# Patient Record
Sex: FEMALE | Race: WHITE
[De-identification: names, ages, dates, MRNs, and addresses within clinical notes are randomized per-mention and may not be internally consistent; named-entity substitution may affect disease eponyms.]

---

## 2019-03-18 ENCOUNTER — HOSPITAL ENCOUNTER (INPATIENT)
Dept: HOSPITAL 54 - ER | Age: 34
LOS: 1 days | Discharge: HOME | DRG: 47 | End: 2019-03-19
Attending: INTERNAL MEDICINE | Admitting: INTERNAL MEDICINE
Payer: MEDICAID

## 2019-03-18 VITALS — DIASTOLIC BLOOD PRESSURE: 68 MMHG | SYSTOLIC BLOOD PRESSURE: 132 MMHG

## 2019-03-18 VITALS — SYSTOLIC BLOOD PRESSURE: 93 MMHG | DIASTOLIC BLOOD PRESSURE: 69 MMHG

## 2019-03-18 VITALS — DIASTOLIC BLOOD PRESSURE: 70 MMHG | SYSTOLIC BLOOD PRESSURE: 92 MMHG

## 2019-03-18 VITALS — SYSTOLIC BLOOD PRESSURE: 97 MMHG | DIASTOLIC BLOOD PRESSURE: 65 MMHG

## 2019-03-18 VITALS — BODY MASS INDEX: 27 KG/M2 | HEIGHT: 67 IN | WEIGHT: 172 LBS

## 2019-03-18 VITALS — SYSTOLIC BLOOD PRESSURE: 88 MMHG | DIASTOLIC BLOOD PRESSURE: 46 MMHG

## 2019-03-18 VITALS — DIASTOLIC BLOOD PRESSURE: 61 MMHG | SYSTOLIC BLOOD PRESSURE: 93 MMHG

## 2019-03-18 VITALS — DIASTOLIC BLOOD PRESSURE: 52 MMHG | SYSTOLIC BLOOD PRESSURE: 100 MMHG

## 2019-03-18 VITALS — SYSTOLIC BLOOD PRESSURE: 92 MMHG | DIASTOLIC BLOOD PRESSURE: 58 MMHG

## 2019-03-18 VITALS — DIASTOLIC BLOOD PRESSURE: 71 MMHG | SYSTOLIC BLOOD PRESSURE: 106 MMHG

## 2019-03-18 VITALS — DIASTOLIC BLOOD PRESSURE: 71 MMHG | SYSTOLIC BLOOD PRESSURE: 103 MMHG

## 2019-03-18 VITALS — SYSTOLIC BLOOD PRESSURE: 93 MMHG | DIASTOLIC BLOOD PRESSURE: 45 MMHG

## 2019-03-18 DIAGNOSIS — R29.810: ICD-10-CM

## 2019-03-18 DIAGNOSIS — G45.9: Primary | ICD-10-CM

## 2019-03-18 DIAGNOSIS — G43.909: ICD-10-CM

## 2019-03-18 LAB
ALBUMIN SERPL BCP-MCNC: 4.1 G/DL (ref 3.4–5)
ALP SERPL-CCNC: 82 U/L (ref 46–116)
ALT SERPL W P-5'-P-CCNC: 22 U/L (ref 12–78)
AST SERPL W P-5'-P-CCNC: 12 U/L (ref 15–37)
BASOPHILS # BLD AUTO: 0.1 /CMM (ref 0–0.2)
BASOPHILS NFR BLD AUTO: 0.8 % (ref 0–2)
BILIRUB DIRECT SERPL-MCNC: 0.1 MG/DL (ref 0–0.2)
BILIRUB SERPL-MCNC: 0.2 MG/DL (ref 0.2–1)
BUN SERPL-MCNC: 17 MG/DL (ref 7–18)
CALCIUM SERPL-MCNC: 9.1 MG/DL (ref 8.5–10.1)
CHLORIDE SERPL-SCNC: 106 MMOL/L (ref 98–107)
CHOLEST SERPL-MCNC: 166 MG/DL (ref ?–200)
CO2 SERPL-SCNC: 28 MMOL/L (ref 21–32)
CREAT SERPL-MCNC: 0.7 MG/DL (ref 0.6–1.3)
EOSINOPHIL NFR BLD AUTO: 1.6 % (ref 0–6)
GLUCOSE SERPL-MCNC: 90 MG/DL (ref 74–106)
HCT VFR BLD AUTO: 41 % (ref 33–45)
HDLC SERPL-MCNC: 64 MG/DL (ref 40–60)
HGB BLD-MCNC: 13.6 G/DL (ref 11.5–14.8)
LDLC SERPL DIRECT ASSAY-MCNC: 92 MG/DL (ref 0–99)
LYMPHOCYTES NFR BLD AUTO: 2.8 /CMM (ref 0.8–4.8)
LYMPHOCYTES NFR BLD AUTO: 43.8 % (ref 20–44)
MCHC RBC AUTO-ENTMCNC: 33 G/DL (ref 31–36)
MCV RBC AUTO: 94 FL (ref 82–100)
MONOCYTES NFR BLD AUTO: 0.4 /CMM (ref 0.1–1.3)
MONOCYTES NFR BLD AUTO: 5.9 % (ref 2–12)
NEUTROPHILS # BLD AUTO: 3 /CMM (ref 1.8–8.9)
NEUTROPHILS NFR BLD AUTO: 47.9 % (ref 43–81)
PLATELET # BLD AUTO: 215 /CMM (ref 150–450)
POTASSIUM SERPL-SCNC: 3.7 MMOL/L (ref 3.5–5.1)
PROT SERPL-MCNC: 8 G/DL (ref 6.4–8.2)
RBC # BLD AUTO: 4.37 MIL/UL (ref 4–5.2)
SODIUM SERPL-SCNC: 144 MMOL/L (ref 136–145)
TRIGL SERPL-MCNC: 90 MG/DL (ref 30–150)
WBC NRBC COR # BLD AUTO: 6.4 K/UL (ref 4.3–11)

## 2019-03-18 PROCEDURE — G0378 HOSPITAL OBSERVATION PER HR: HCPCS

## 2019-03-18 PROCEDURE — A9579 GAD-BASE MR CONTRAST NOS,1ML: HCPCS

## 2019-03-18 NOTE — NUR
INITIAL ICU RN NOTE



RCVD PT AWAKE AND ALERT, SR/SB ON MONITOR, ON RA TOLERATING WELL, CONTINENT OF BOWEL AND 
URINE, SKIN INTACT. NEURO ASSESSMENT DONE PERIPHERAL VISION OVER LEFT SIDE PT SEES DOUBLE, 
RIGHT EYE SENSITIVE TO BRIGHT LIGHT, PUPILS EQUAL AND RESPONSIVE, DECREASED SENSATION OVER 
RIGHT ANTERIOR FOOT, RIGHT UPPER AND LOWER EXTREMITY REMAINS NUMB AND FEELS HEAVY PER PT'S 
REPORT, FULL ROM WITH ALL EXTREMITIES, DRIFT NOTED WITH RUE AND RLE DROPS TO BED BEFORE FIVE 
SECONDS. FINDINGS REPORTED TO DR. PINEDA WHO ACKNOWLEDGED. NO NEW ORDERS RCVD. WILL CONTINUE 
TO MONITOR.

## 2019-03-18 NOTE — NUR
C/O R SIDE OF THE FACE NUMBNESS AND DIZZY SINCE YESTERDAY 5PM.  ALSO 
RIGHT-SIDED WEAKNESS.  HAS HEADACHE 8/10.  SPEECH IS SLOW AND WEAK, BUT NO 
EVIDENCE OF SLURRING PRESENT.  PT DENIES DIFFICULTY SWALLOWING.  SHE IS AOX4, 
AMB, VSS, RR EVEN AND UNLABORED.  DENIES SOB, N/V, SKIN INTACT.  SEEN BY DR CAUSEY.

## 2019-03-18 NOTE — NUR
ICU RN NOTE



PT REMAINS STABLE, TELE STATUS PER DR. PINEDA, ON RA TOLERATING WELL, SR ON MONITOR, APPEARS 
MORE STEADY WHEN AMBULATING, TOLERATED PO INTAKE WELL. PT STATES TO FEEL MUCH BETTER NOW. 
PT'S CARE ENDORSED TO NIGHT SHIFT RN FOR CONTINUITY OF CARE, BE DIN LOW AND LOCKED POSITION. 
CALL LIGHT WITHIN REACH.

## 2019-03-19 VITALS — DIASTOLIC BLOOD PRESSURE: 50 MMHG | SYSTOLIC BLOOD PRESSURE: 91 MMHG

## 2019-03-19 VITALS — DIASTOLIC BLOOD PRESSURE: 55 MMHG | SYSTOLIC BLOOD PRESSURE: 86 MMHG

## 2019-03-19 VITALS — DIASTOLIC BLOOD PRESSURE: 55 MMHG | SYSTOLIC BLOOD PRESSURE: 90 MMHG

## 2019-03-19 VITALS — DIASTOLIC BLOOD PRESSURE: 72 MMHG | SYSTOLIC BLOOD PRESSURE: 112 MMHG

## 2019-03-19 VITALS — DIASTOLIC BLOOD PRESSURE: 55 MMHG | SYSTOLIC BLOOD PRESSURE: 97 MMHG

## 2019-03-19 VITALS — DIASTOLIC BLOOD PRESSURE: 53 MMHG | SYSTOLIC BLOOD PRESSURE: 83 MMHG

## 2019-03-19 VITALS — DIASTOLIC BLOOD PRESSURE: 65 MMHG | SYSTOLIC BLOOD PRESSURE: 91 MMHG

## 2019-03-19 VITALS — DIASTOLIC BLOOD PRESSURE: 56 MMHG | SYSTOLIC BLOOD PRESSURE: 88 MMHG

## 2019-03-19 LAB
CHOLEST SERPL-MCNC: 157 MG/DL (ref ?–200)
HDLC SERPL-MCNC: 55 MG/DL (ref 40–60)
LDLC SERPL DIRECT ASSAY-MCNC: 90 MG/DL (ref 0–99)
TRIGL SERPL-MCNC: 96 MG/DL (ref 30–150)
TSH SERPL DL<=0.005 MIU/L-ACNC: 1.21 UIU/ML (ref 0.36–3.74)

## 2019-03-19 NOTE — NUR
TELE RN NOTE:



PATIENT RESTING IN BED, NO ACUTE DISTRESS NOTED. BREATHING EVEN AND UNLABORED, NO SOB NOTED. 
IV TO RAC IN PLACE. BED LOCKED AND IN LOWEST POSITION, CALL LIGHT IN REACH. WILL ENDORSE TO 
DAY NURSE TO CONTINUE WITH PLAN OF CARE.

## 2019-03-19 NOTE — NUR
TELE RN NOTES



Received patient in bed, alert, oriented x 4. Breathing even and unlabored. not in any 
distress. no complaints of pain or discomfort as of this time. Patient stable as endorsed by 
the morning shift RN. Will continue to monitor

## 2019-03-19 NOTE — NUR
MS/RN   Headache



Complaining of headache, no orders for any pain medication. Dr Decker made aware and order 
given for tylenol only until seen by neurologist.

## 2019-03-19 NOTE — NUR
MS/RN   Rounds



Awaiting call back from Dr Decker, patient requesting to see neurologist and be discharged.

## 2019-03-19 NOTE — NUR
MS/RN   End note



Patient remains in stable condition, all needs attended, will endorse to night shift.

## 2019-03-19 NOTE — NUR
TELE RN NOTE:



RECEIVED PATIENT FROM ICU, NO ACUTE DISTRESS NOTED. BREATHING EVEN AND UNLABORED, NO SOB 
NOTED. IV TO RAC IN PLACE. BED LOCKED AND IN LOWEST POSITION, CALL LIGHT IN REACH. WILL 
CONTINUE TO MONITOR.

## 2019-03-19 NOTE — NUR
RN NOTES



Patient wheeled down to the parking lot by CNA in stable condition. All belongings brought 
down with patient

## 2019-03-19 NOTE — NUR
MS/RN   S/B Dr Ponce



Seen by Dr Ponce - waiting for consultation notes, ibuprofen 600mg ordered for headache.

## 2019-12-29 ENCOUNTER — HOSPITAL ENCOUNTER (EMERGENCY)
Dept: HOSPITAL 54 - ER | Age: 34
Discharge: HOME | End: 2019-12-29
Payer: MEDICAID

## 2019-12-29 VITALS — BODY MASS INDEX: 25.71 KG/M2 | WEIGHT: 160 LBS | HEIGHT: 66 IN

## 2019-12-29 VITALS — SYSTOLIC BLOOD PRESSURE: 107 MMHG | DIASTOLIC BLOOD PRESSURE: 67 MMHG

## 2019-12-29 DIAGNOSIS — L91.8: Primary | ICD-10-CM

## 2019-12-29 DIAGNOSIS — Z79.82: ICD-10-CM

## 2020-10-18 NOTE — NUR
RN NOTES



Patient for discharge. Discharge instructions given. Disc for all radiology imaging given. 
IV line and ID band taken out Home

## 2021-02-21 ENCOUNTER — HOSPITAL ENCOUNTER (EMERGENCY)
Dept: HOSPITAL 54 - ER | Age: 36
Discharge: HOME | End: 2021-02-21
Payer: MEDICAID

## 2021-02-21 VITALS — SYSTOLIC BLOOD PRESSURE: 114 MMHG | DIASTOLIC BLOOD PRESSURE: 73 MMHG

## 2021-02-21 VITALS — WEIGHT: 170 LBS | HEIGHT: 66 IN | BODY MASS INDEX: 27.32 KG/M2

## 2021-02-21 DIAGNOSIS — R11.0: ICD-10-CM

## 2021-02-21 DIAGNOSIS — G43.909: Primary | ICD-10-CM

## 2021-02-21 DIAGNOSIS — Z79.82: ICD-10-CM

## 2021-02-21 PROCEDURE — 96361 HYDRATE IV INFUSION ADD-ON: CPT

## 2021-02-21 PROCEDURE — 96375 TX/PRO/DX INJ NEW DRUG ADDON: CPT

## 2021-02-21 PROCEDURE — 99284 EMERGENCY DEPT VISIT MOD MDM: CPT

## 2021-02-21 PROCEDURE — 96374 THER/PROPH/DIAG INJ IV PUSH: CPT

## 2021-02-21 NOTE — NUR
MIGRAINE HEADACHE. PATIENT A/OX4, BREATHING EVEN AND UNLABORED, EYES ARE 
SENSITIVE TO LIGHT PER PATIENT. NEEDS ATTENDED.

## 2021-02-21 NOTE — NUR
Patient alert and oriented x4.Patient discharged to home in stable condition. 
Written and verbal after care instructions given. Patient verbalizes 
understanding of instruction. IV line removed, no bleeding noted but the site 
ocvered with folded 2x2. The patient left the hopsital in stable condition and 
accompanied by family member.

## 2021-07-07 ENCOUNTER — HOSPITAL ENCOUNTER (EMERGENCY)
Dept: HOSPITAL 54 - ER | Age: 36
Discharge: HOME | End: 2021-07-07
Payer: MEDICAID

## 2021-07-07 VITALS — BODY MASS INDEX: 25.88 KG/M2 | WEIGHT: 161 LBS | HEIGHT: 66 IN

## 2021-07-07 VITALS — SYSTOLIC BLOOD PRESSURE: 105 MMHG | DIASTOLIC BLOOD PRESSURE: 66 MMHG

## 2021-07-07 DIAGNOSIS — Z3A.08: ICD-10-CM

## 2021-07-07 DIAGNOSIS — R10.30: ICD-10-CM

## 2021-07-07 DIAGNOSIS — O26.91: Primary | ICD-10-CM

## 2021-07-07 LAB
BASOPHILS # BLD AUTO: 0 K/UL (ref 0–0.2)
BASOPHILS NFR BLD AUTO: 0.4 % (ref 0–2)
BUN SERPL-MCNC: 8 MG/DL (ref 7–18)
CALCIUM SERPL-MCNC: 8.7 MG/DL (ref 8.5–10.1)
CHLORIDE SERPL-SCNC: 103 MMOL/L (ref 98–107)
CO2 SERPL-SCNC: 26 MMOL/L (ref 21–32)
COLOR UR: YELLOW
CREAT SERPL-MCNC: 0.6 MG/DL (ref 0.6–1.3)
EOSINOPHIL NFR BLD AUTO: 1 % (ref 0–6)
GLUCOSE SERPL-MCNC: 88 MG/DL (ref 74–106)
HCT VFR BLD AUTO: 37 % (ref 33–45)
HGB BLD-MCNC: 12.5 G/DL (ref 11.5–14.8)
LYMPHOCYTES NFR BLD AUTO: 2.1 K/UL (ref 0.8–4.8)
LYMPHOCYTES NFR BLD AUTO: 31.3 % (ref 20–44)
MCHC RBC AUTO-ENTMCNC: 34 G/DL (ref 31–36)
MCV RBC AUTO: 93 FL (ref 82–100)
MONOCYTES NFR BLD AUTO: 0.3 K/UL (ref 0.1–1.3)
MONOCYTES NFR BLD AUTO: 4.1 % (ref 2–12)
NEUTROPHILS # BLD AUTO: 4.3 K/UL (ref 1.8–8.9)
NEUTROPHILS NFR BLD AUTO: 63.2 % (ref 43–81)
PH UR STRIP: 7 [PH] (ref 5–8)
PLATELET # BLD AUTO: 212 K/UL (ref 150–450)
POTASSIUM SERPL-SCNC: 3.3 MMOL/L (ref 3.5–5.1)
RBC # BLD AUTO: 3.92 MIL/UL (ref 4–5.2)
SODIUM SERPL-SCNC: 137 MMOL/L (ref 136–145)
UROBILINOGEN UR STRIP-MCNC: 0.2 EU/DL
WBC NRBC COR # BLD AUTO: 6.9 K/UL (ref 4.3–11)

## 2021-07-07 NOTE — NUR
LOW ABDOMINAL PAIN,VAGINAL SPOTTING X 2 DAYS, HOME PREGNANCY CAME UP POSITIVE, 
HAS IUD. PATIENT ASSISTED TO ROOM 16. CHANGED INTO A GOWN. ATTACHED TO THE 
CARDIAC MONITOR.

## 2021-07-07 NOTE — NUR
Patient a/ox4, breathing even and unlabored, no sob noted, ambulatory with 
steady gait.  Patient discharged to home in stable condition. Written and 
verbal after care instructions given. Patient verbalizes understanding of 
instruction.